# Patient Record
Sex: FEMALE | Race: WHITE | URBAN - METROPOLITAN AREA
[De-identification: names, ages, dates, MRNs, and addresses within clinical notes are randomized per-mention and may not be internally consistent; named-entity substitution may affect disease eponyms.]

---

## 2017-08-20 ENCOUNTER — APPOINTMENT (OUTPATIENT)
Dept: ULTRASOUND IMAGING | Facility: CLINIC | Age: 52
End: 2017-08-20
Attending: EMERGENCY MEDICINE
Payer: COMMERCIAL

## 2017-08-20 ENCOUNTER — HOSPITAL ENCOUNTER (EMERGENCY)
Facility: CLINIC | Age: 52
Discharge: HOME | End: 2017-08-20
Attending: EMERGENCY MEDICINE | Admitting: EMERGENCY MEDICINE
Payer: COMMERCIAL

## 2017-08-20 VITALS
OXYGEN SATURATION: 96 % | DIASTOLIC BLOOD PRESSURE: 66 MMHG | HEART RATE: 74 BPM | WEIGHT: 157.4 LBS | TEMPERATURE: 98.3 F | RESPIRATION RATE: 20 BRPM | SYSTOLIC BLOOD PRESSURE: 107 MMHG

## 2017-08-20 DIAGNOSIS — R19.00 PELVIC MASS: ICD-10-CM

## 2017-08-20 PROCEDURE — 76856 US EXAM PELVIC COMPLETE: CPT

## 2017-08-20 PROCEDURE — 99284 EMERGENCY DEPT VISIT MOD MDM: CPT | Mod: 25

## 2017-08-20 RX ORDER — IBUPROFEN 600 MG/1
600 TABLET, FILM COATED ORAL EVERY 8 HOURS PRN
Qty: 30 TABLET | Refills: 0 | Status: SHIPPED | OUTPATIENT
Start: 2017-08-20

## 2017-08-20 RX ORDER — HYDROCODONE BITARTRATE AND ACETAMINOPHEN 5; 325 MG/1; MG/1
1-2 TABLET ORAL EVERY 6 HOURS PRN
Qty: 15 TABLET | Refills: 0 | Status: SHIPPED | OUTPATIENT
Start: 2017-08-20

## 2017-08-20 ASSESSMENT — ENCOUNTER SYMPTOMS
HEMATURIA: 1
VOMITING: 0
FEVER: 0
ABDOMINAL PAIN: 0

## 2017-08-20 NOTE — ED AVS SNAPSHOT
Emergency Department    [address] [facility] [address]    Phone:  [phone]    Fax:  [phone]                                       Yara Pelayo   MRN: [id number]    Department:   Emergency Department   Date of Visit:  8/20/2017           Patient Information     Date Of Birth          1965        Your diagnoses for this visit were:     Pelvic mass Seen on US, poss from cervix       You were seen by Chino Barraza MD.      Follow-up Information     Follow up with Your GYN, as discussed. Schedule an appointment as soon as possible for a visit in 1 day.        Follow up with  Emergency Department.    Specialty:  EMERGENCY MEDICINE    Why:  If symptoms worsen, specifically with any bright red blood    Contact information:    [address] [facility] [id number] [phone]        Discharge Instructions       Here is your ultrasound result from today:  PELVIC ULTRASOUND  WITH TRANSABDOMINAL TRANSDUCER  8/20/2017 8:18 PM      HISTORY: Suspect mass ejecting from cervix, very tender.     TECHNIQUE:  Transabdominal sonography was performed to evaluate the  uterus and ovaries.       COMPARISON: None.     FINDINGS:  The uterus measures 6.9 x 4.9 x 3.5 cm and is grossly  unremarkable. Heterogeneously echoic structure adjacent to the cervix  on the transabdominal study measures 5.2 x 4.5 x 5.6 cm. It is  difficult to see whether this mass extends from the cervix or is part  of the vaginal canal. Clinical correlation is recommended. Endometrial  stripe is grossly within normal limits at 0.2 cm. Uterus is somewhat  limited in evaluation due to lack of transvaginal imaging. Specific  request by the clinical service to not perform transvaginal  evaluation.     Ovaries are not visualized and cannot be evaluated. No abnormal free  fluid collections are seen.         IMPRESSION:  1. Heterogeneously echoic masslike structure just distal to the cervix  could  be coming from the cervix but could also be from adjacent  vaginal wall. This is somewhat suboptimally seen on this study. This  was imaged transabdominally and attempted translabial. It was not well  seen on the translabial imaging. I recommend clinical correlation.  2. No other abnormalities are identified. Ovaries are not seen and  cannot be evaluated on this study.     Discharge References/Attachments     BIOPSY, CERVICAL, UNDERSTANDING (ENGLISH)    REPRODUCTIVE ANATOMY, FEMALE (ENGLISH)      24 Hour Appointment Hotline       To make an appointment at any [facility] call [phone] ([phone]). If you don't have a family doctor or clinic, we will help you find one. [place] clinics are conveniently located to serve the needs of you and your family.             Review of your medicines      START taking        Dose / Directions Last dose taken    HYDROcodone-acetaminophen 5-325 MG per tablet   Commonly known as:  NORCO   Dose:  1-2 tablet   Quantity:  15 tablet        Take 1-2 tablets by mouth every 6 hours as needed for moderate to severe pain   Refills:  0        ibuprofen 600 MG tablet   Commonly known as:  ADVIL/MOTRIN   Dose:  600 mg   Quantity:  30 tablet        Take 1 tablet (600 mg) by mouth every 8 hours as needed for moderate pain   Refills:  0          Our records show that you are taking the medicines listed below. If these are incorrect, please call your family doctor or clinic.        Dose / Directions Last dose taken    LEXAPRO PO        Refills:  0                Prescriptions were sent or printed at these locations (2 Prescriptions)                   Other Prescriptions                Printed at Department/Unit printer (2 of 2)         ibuprofen (ADVIL/MOTRIN) 600 MG tablet               HYDROcodone-acetaminophen (NORCO) 5-325 MG per tablet                Procedures and tests performed during your visit     Pelvis US, complete      Orders Needing Specimen Collection     None       Pending Results     No orders found from 8/18/2017 to 8/21/2017.            Pending Culture Results     No orders found from 8/18/2017 to 8/21/2017.            Pending Results Instructions     If you had any lab results that were not finalized at the time of your Discharge, you can call the ED Lab Result RN at [phone]. You will be contacted by this team for any positive Lab results or changes in treatment. The nurses are available 7 days a week from 10A to 6:30P.  You can leave a message 24 hours per day and they will return your call.        Test Results From Your Hospital Stay        8/20/2017  8:56 PM      Narrative     PELVIC ULTRASOUND  WITH TRANSABDOMINAL TRANSDUCER  8/20/2017 8:18 PM     HISTORY: Suspect mass ejecting from cervix, very tender.    TECHNIQUE:  Transabdominal sonography was performed to evaluate the  uterus and ovaries.      COMPARISON: None.    FINDINGS:  The uterus measures 6.9 x 4.9 x 3.5 cm and is grossly  unremarkable. Heterogeneously echoic structure adjacent to the cervix  on the transabdominal study measures 5.2 x 4.5 x 5.6 cm. It is  difficult to see whether this mass extends from the cervix or is part  of the vaginal canal. Clinical correlation is recommended. Endometrial  stripe is grossly within normal limits at 0.2 cm. Uterus is somewhat  limited in evaluation due to lack of transvaginal imaging. Specific  request by the clinical service to not perform transvaginal  evaluation.    Ovaries are not visualized and cannot be evaluated. No abnormal free  fluid collections are seen.        Impression     IMPRESSION:  1. Heterogeneously echoic masslike structure just distal to the cervix  could be coming from the cervix but could also be from adjacent  vaginal wall. This is somewhat suboptimally seen on this study. This  was imaged transabdominally and attempted translabial. It was not well  seen on the translabial imaging. I recommend clinical correlation.  2. No other  abnormalities are identified. Ovaries are not seen and  cannot be evaluated on this study.     DRU KANG MD                Clinical Quality Measure: Blood Pressure Screening     Your blood pressure was checked while you were in the emergency department today. The last reading we obtained was  BP: 107/66 . Please read the guidelines below about what these numbers mean and what you should do about them.  If your systolic blood pressure (the top number) is less than 120 and your diastolic blood pressure (the bottom number) is less than 80, then your blood pressure is normal. There is nothing more that you need to do about it.  If your systolic blood pressure (the top number) is 120-139 or your diastolic blood pressure (the bottom number) is 80-89, your blood pressure may be higher than it should be. You should have your blood pressure rechecked within a year by a primary care provider.  If your systolic blood pressure (the top number) is 140 or greater or your diastolic blood pressure (the bottom number) is 90 or greater, you may have high blood pressure. High blood pressure is treatable, but if left untreated over time it can put you at risk for heart attack, stroke, or kidney failure. You should have your blood pressure rechecked by a primary care provider within the next 4 weeks.  If your provider in the emergency department today gave you specific instructions to follow-up with your doctor or provider even sooner than that, you should follow that instruction and not wait for up to 4 weeks for your follow-up visit.        Thank you for choosing [facility]       Thank you for choosing [facility] for your care. Our goal is always to provide you with excellent care. Hearing back from our patients is one way we can continue to improve our services. Please take a few minutes to complete the written survey that you may receive in the mail after you visit with us. Thank you!        [facility] Information     [email or web address] lets you  "send messages to your doctor, view your test results, renew your prescriptions, schedule appointments and more. To sign up, go to [email or web address] . Click on \"Log in\" on the left side of the screen, which will take you to the Welcome page. Then click on \"Sign up Now\" on the right side of the page.     You will be asked to enter the access code listed below, as well as some personal information. Please follow the directions to create your username and password.     Your access code is: [email or web address]  Expires: 2017  9:17 PM     Your access code will  in 90 days. If you need help or a new code, please call your [place] clinic or [phone].        Care EveryWhere ID     This is your Care EveryWhere ID. This could be used by other organizations to access your [place] medical records  [id number]        Equal Access to Services     MIKI MILLER : Hadii vicky Son, waaxda michelle, qaybta kaalmada ariane, los brito . So [facility] [phone].    ATENCIÓN: Si habla español, tiene a chowdhury disposición servicios gratuitos de asistencia lingüística. Llame al [phone].    We comply with applicable federal civil rights laws and [place] laws. We do not discriminate on the basis of race, color, national origin, age, disability sex, sexual orientation or gender identity.            After Visit Summary       This is your record. Keep this with you and show to your community pharmacist(s) and doctor(s) at your next visit.                  "

## 2017-08-20 NOTE — ED AVS SNAPSHOT
Emergency Department    [address] [place] [address]    Phone:  [phone]    Fax:  [phone]                                       Yara Pelayo   MRN: [id number]    Department:   Emergency Department   Date of Visit:  8/20/2017           After Visit Summary Signature Page     I have received my discharge instructions, and my questions have been answered. I have discussed any challenges I see with this plan with the nurse or doctor.    ..........................................................................................................................................  Patient/Patient Representative Signature      ..........................................................................................................................................  Patient Representative Print Name and Relationship to Patient    ..................................................               ................................................  Date                                            Time    ..........................................................................................................................................  Reviewed by Signature/Title    ...................................................              ..............................................  Date                                                            Time

## 2017-08-20 NOTE — ED PROVIDER NOTES
History     Chief Complaint:  Groin Discomfort      HPI   Yara Pelayo is a 52 year old female who presents with groin discomfort. On Wednesday the patient went to see her provider due to spotting and pelvic pressure. While there she was given medication to treat for a yeast infection. Then today the patient was playing tennis when she went to use the bathroom and noticed a mass extruding from her groin, along with some bloody tissue. This brought her to the emergency department today. Currently the patient states that she has an uncomfortable pressure, but not pain, in her groin area, which is not too bad when she is not moving. The patient denies any fever, vomiting, abdominal pain, family history of bleeding disorder, or any similar issues before.    Of note, the patient has had three vaginal births.  Denies urinary retention, denies ever being told she has a cystocele or pelvic organ prolapse.    Allergies:  No known drug allergies     Medications:    Lexapro  Past Medical History:    The patient is not currently taking any prescribed medications.     Past Surgical History:    History reviewed. No pertinent surgical history.     Family History:    History reviewed. No pertinent family history.        Social History:  Presents with her    Tobacco use: Never smoked  Alcohol use: N/A  PCP: [facility]    Marital Status:        Review of Systems   Constitutional: Negative for fever.   Gastrointestinal: Negative for abdominal pain and vomiting.   Genitourinary: Positive for hematuria and vaginal bleeding.   All other systems reviewed and are negative.    Physical Exam     Patient Vitals for the past 24 hrs:   BP Temp Pulse Resp SpO2 Weight   08/20/17 2018 107/66 - - - 96 % -   08/20/17 1836 133/59 98.3  F (36.8  C) 74 20 96 % 71.4 kg (157 lb 6.4 oz)      Physical Exam    General: Pt seen on [facility], pleasant, cooperative, and alert to conversation  Eyes: Tracking  well, clear conj BL  ENT: MMM, neck supple with no TTP  Abd: Soft, non tender, no guarding, no rebound. Non distended  - With RN chaperone- normal external exam, small fleshy mass, firm, roughly 1-2cm in diameter seen protruding from what appears to be the cervical os.  + TTP, limited exam with significant tenderness.  No clots seen, no other lesions.   Skin: No skin changes, no edema or rash present to extremities  Neuro: Clear speech and no facial droop.  Psych: Not RIS, no e/o AH/VH     Emergency Department Course       Imaging:  Radiographic findings were communicated with the patient who voiced understanding of the findings.    Pelvis US:     IMPRESSION: 1. Heterogeneously echoic masslike structure just distal to the cervix could be coming from the cervix but could also be from adjacent vaginal wall. This is somewhat suboptimally seen on this study. This was imaged transabdominally and attempted translabial. It was not well seen on the translabial imaging. I recommend clinical correlation. 2. No other abnormalities are identified. Ovaries are not seen and cannot be evaluated on this study.     Preliminary result per radiology.    Emergency Department Course:    Past medical records, nursing notes, and vitals reviewed.  1850: I performed an exam of the patient and obtained history, as documented above.  The patient was sent for a US Pelvis while in the emergency department, findings above.   1907: Pelvic Exam  I personally reviewed the laboratory results with the Patient and answered all related questions prior to discharge.  2114: I rechecked the patient. Findings and plan explained to the Patient. Patient discharged home with instructions regarding supportive care, medications, and reasons to return. The importance of close follow-up was reviewed.          Impression & Plan      Medical Decision Making:    Yara Pelayo is a 52 year old female who presents with groin pain. Pelvic exam significant with mass  that appears to be coming out of the cervix, and is very tender in an otherwise limited exam. No significant bleeding noted and no evidence  of hemorrhage. The pts pain is well controlled and the pt herself has normal vital signs here in the emergency room. An ultra sound was done that confirm that the patient has a mass, unclear exactly where its coming from, favor cervical origin given cramping pain and exam. My differential this time is aborting fibroid versus benign cervical tumor versus cervical adenocarcinoma versus other lesion. I see no evidence of STIs nor evidence of infection. Lesions are discrete and well demarcated, and the patient is in no pain when lesions not being irritated. I do feel the patient is safe to follow with her OBGYN tomorrow, she states she feels comfortable calling their office tomorrow. I have also printed off the results of her pelvic ultrasound today and given them to the patient to expedite her care. I will give support of care and return to ED precautions.     Diagnosis:    ICD-10-CM   1. Pelvic mass R19.00          Disposition:  The patient was discharged to home with a plan as discussed.     Discharge Medications:  Discharge Medication List as of 8/20/2017  9:17 PM      START taking these medications    Details   ibuprofen (ADVIL/MOTRIN) 600 MG tablet Take 1 tablet (600 mg) by mouth every 8 hours as needed for moderate pain, Disp-30 tablet, R-0, Local Print      HYDROcodone-acetaminophen (NORCO) 5-325 MG per tablet Take 1-2 tablets by mouth every 6 hours as needed for moderate to severe pain, Disp-15 tablet, R-0, Local Print             Misha Miramontes  8/20/2017    EMERGENCY DEPARTMENT    I, Misha Miramontes, am serving as a scribe at 6:50 PM on 8/20/2017 to document services personally performed by Teddy Barraza MD based on my observations and the provider's statements to me.       Chino Barraza MD  08/20/17 8974

## 2017-08-21 NOTE — DISCHARGE INSTRUCTIONS
Here is your ultrasound result from today:  PELVIC ULTRASOUND  WITH TRANSABDOMINAL TRANSDUCER  8/20/2017 8:18 PM      HISTORY: Suspect mass ejecting from cervix, very tender.     TECHNIQUE:  Transabdominal sonography was performed to evaluate the  uterus and ovaries.       COMPARISON: None.     FINDINGS:  The uterus measures 6.9 x 4.9 x 3.5 cm and is grossly  unremarkable. Heterogeneously echoic structure adjacent to the cervix  on the transabdominal study measures 5.2 x 4.5 x 5.6 cm. It is  difficult to see whether this mass extends from the cervix or is part  of the vaginal canal. Clinical correlation is recommended. Endometrial  stripe is grossly within normal limits at 0.2 cm. Uterus is somewhat  limited in evaluation due to lack of transvaginal imaging. Specific  request by the clinical service to not perform transvaginal  evaluation.     Ovaries are not visualized and cannot be evaluated. No abnormal free  fluid collections are seen.         IMPRESSION:  1. Heterogeneously echoic masslike structure just distal to the cervix  could be coming from the cervix but could also be from adjacent  vaginal wall. This is somewhat suboptimally seen on this study. This  was imaged transabdominally and attempted translabial. It was not well  seen on the translabial imaging. I recommend clinical correlation.  2. No other abnormalities are identified. Ovaries are not seen and  cannot be evaluated on this study.

## 2017-11-27 ENCOUNTER — HOSPITAL ENCOUNTER (OUTPATIENT)
Dept: MAMMOGRAPHY | Facility: CLINIC | Age: 52
Discharge: HOME | End: 2017-11-27
Attending: PHYSICIAN ASSISTANT | Admitting: PHYSICIAN ASSISTANT
Payer: COMMERCIAL

## 2017-11-27 DIAGNOSIS — Z12.31 VISIT FOR SCREENING MAMMOGRAM: ICD-10-CM

## 2017-11-27 PROCEDURE — 77063 BREAST TOMOSYNTHESIS BI: CPT

## 2017-11-27 PROCEDURE — G0202 SCR MAMMO BI INCL CAD: HCPCS

## 2019-04-18 ENCOUNTER — HOSPITAL PATHOLOGY (OUTPATIENT)
Dept: OTHER | Facility: CLINIC | Age: 54
End: 2019-04-18

## 2019-04-19 LAB — COPATH REPORT: NORMAL

## 2019-09-11 ENCOUNTER — HOSPITAL ENCOUNTER (OUTPATIENT)
Dept: MAMMOGRAPHY | Facility: CLINIC | Age: 54
Discharge: HOME | End: 2019-09-11
Attending: PHYSICIAN ASSISTANT | Admitting: PHYSICIAN ASSISTANT
Payer: COMMERCIAL

## 2019-09-11 DIAGNOSIS — Z12.31 VISIT FOR SCREENING MAMMOGRAM: ICD-10-CM

## 2019-09-11 PROCEDURE — 77063 BREAST TOMOSYNTHESIS BI: CPT
